# Patient Record
Sex: MALE | Race: WHITE
[De-identification: names, ages, dates, MRNs, and addresses within clinical notes are randomized per-mention and may not be internally consistent; named-entity substitution may affect disease eponyms.]

---

## 2018-01-03 ENCOUNTER — HOSPITAL ENCOUNTER (EMERGENCY)
Dept: HOSPITAL 63 - ER | Age: 48
Discharge: HOME | End: 2018-01-03
Payer: COMMERCIAL

## 2018-01-03 VITALS — SYSTOLIC BLOOD PRESSURE: 155 MMHG | DIASTOLIC BLOOD PRESSURE: 115 MMHG

## 2018-01-03 VITALS — WEIGHT: 190 LBS | BODY MASS INDEX: 22.43 KG/M2 | HEIGHT: 77 IN

## 2018-01-03 DIAGNOSIS — F17.210: ICD-10-CM
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
## 2018-01-03 DIAGNOSIS — M54.2: ICD-10-CM
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
## 2018-01-03 DIAGNOSIS — R07.2: ICD-10-CM

## 2018-01-03 DIAGNOSIS — S06.0X0A: Primary | ICD-10-CM

## 2018-01-03 PROCEDURE — 70450 CT HEAD/BRAIN W/O DYE: CPT

## 2018-01-03 PROCEDURE — 71101 X-RAY EXAM UNILAT RIBS/CHEST: CPT

## 2018-01-03 PROCEDURE — 72125 CT NECK SPINE W/O DYE: CPT
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
